# Patient Record
Sex: FEMALE | Race: OTHER | HISPANIC OR LATINO
[De-identification: names, ages, dates, MRNs, and addresses within clinical notes are randomized per-mention and may not be internally consistent; named-entity substitution may affect disease eponyms.]

---

## 2017-01-16 ENCOUNTER — LABORATORY RESULT (OUTPATIENT)
Age: 27
End: 2017-01-16

## 2017-01-16 ENCOUNTER — APPOINTMENT (OUTPATIENT)
Dept: INTERNAL MEDICINE | Facility: CLINIC | Age: 27
End: 2017-01-16

## 2017-01-16 VITALS
DIASTOLIC BLOOD PRESSURE: 82 MMHG | HEART RATE: 70 BPM | HEIGHT: 60 IN | SYSTOLIC BLOOD PRESSURE: 124 MMHG | RESPIRATION RATE: 16 BRPM | WEIGHT: 187 LBS | BODY MASS INDEX: 36.71 KG/M2

## 2017-01-17 LAB
25(OH)D3 SERPL-MCNC: 12.5 NG/ML
ALBUMIN SERPL ELPH-MCNC: 4.4 G/DL
ALP BLD-CCNC: 86 U/L
ALT SERPL-CCNC: 13 U/L
ANION GAP SERPL CALC-SCNC: 12 MMOL/L
APPEARANCE: CLEAR
AST SERPL-CCNC: 10 U/L
BASOPHILS # BLD AUTO: 0.03 K/UL
BASOPHILS NFR BLD AUTO: 0.3 %
BILIRUB SERPL-MCNC: 0.3 MG/DL
BILIRUBIN URINE: NEGATIVE
BLOOD URINE: NEGATIVE
BUN SERPL-MCNC: 9 MG/DL
CALCIUM SERPL-MCNC: 9.4 MG/DL
CHLORIDE SERPL-SCNC: 103 MMOL/L
CHOLEST SERPL-MCNC: 155 MG/DL
CHOLEST/HDLC SERPL: 5.2 RATIO
CO2 SERPL-SCNC: 26 MMOL/L
COLOR: YELLOW
CREAT SERPL-MCNC: 0.79 MG/DL
EOSINOPHIL # BLD AUTO: 0.09 K/UL
EOSINOPHIL NFR BLD AUTO: 0.9 %
GLUCOSE QUALITATIVE U: NORMAL MG/DL
GLUCOSE SERPL-MCNC: 101 MG/DL
HBA1C MFR BLD HPLC: 5.5 %
HCT VFR BLD CALC: 38.7 %
HDLC SERPL-MCNC: 30 MG/DL
HGB BLD-MCNC: 12.8 G/DL
IMM GRANULOCYTES NFR BLD AUTO: 0.2 %
KETONES URINE: NEGATIVE
LDLC SERPL CALC-MCNC: 109 MG/DL
LEUKOCYTE ESTERASE URINE: ABNORMAL
LYMPHOCYTES # BLD AUTO: 2.75 K/UL
LYMPHOCYTES NFR BLD AUTO: 27.1 %
MAN DIFF?: NORMAL
MCHC RBC-ENTMCNC: 28.8 PG
MCHC RBC-ENTMCNC: 33.1 GM/DL
MCV RBC AUTO: 87.2 FL
MONOCYTES # BLD AUTO: 0.64 K/UL
MONOCYTES NFR BLD AUTO: 6.3 %
NEUTROPHILS # BLD AUTO: 6.63 K/UL
NEUTROPHILS NFR BLD AUTO: 65.2 %
NITRITE URINE: NEGATIVE
PH URINE: 6.5
PLATELET # BLD AUTO: 287 K/UL
POTASSIUM SERPL-SCNC: 4.7 MMOL/L
PROT SERPL-MCNC: 7.2 G/DL
PROTEIN URINE: NEGATIVE MG/DL
RBC # BLD: 4.44 M/UL
RBC # FLD: 14.6 %
SODIUM SERPL-SCNC: 141 MMOL/L
SPECIFIC GRAVITY URINE: 1.01
T4 FREE SERPL-MCNC: 1.2 NG/DL
TRIGL SERPL-MCNC: 81 MG/DL
TSH SERPL-ACNC: 1.31 UIU/ML
UROBILINOGEN URINE: NORMAL MG/DL
WBC # FLD AUTO: 10.16 K/UL

## 2018-02-01 ENCOUNTER — TRANSCRIPTION ENCOUNTER (OUTPATIENT)
Age: 28
End: 2018-02-01

## 2018-04-24 ENCOUNTER — LABORATORY RESULT (OUTPATIENT)
Age: 28
End: 2018-04-24

## 2018-04-24 ENCOUNTER — APPOINTMENT (OUTPATIENT)
Dept: INTERNAL MEDICINE | Facility: CLINIC | Age: 28
End: 2018-04-24
Payer: COMMERCIAL

## 2018-04-24 VITALS
WEIGHT: 174 LBS | DIASTOLIC BLOOD PRESSURE: 76 MMHG | HEART RATE: 78 BPM | SYSTOLIC BLOOD PRESSURE: 118 MMHG | BODY MASS INDEX: 34.16 KG/M2 | HEIGHT: 60 IN | RESPIRATION RATE: 12 BRPM

## 2018-04-24 DIAGNOSIS — E04.9 NONTOXIC GOITER, UNSPECIFIED: ICD-10-CM

## 2018-04-24 PROCEDURE — 36415 COLL VENOUS BLD VENIPUNCTURE: CPT

## 2018-04-24 PROCEDURE — 99395 PREV VISIT EST AGE 18-39: CPT | Mod: 25

## 2018-04-25 LAB
25(OH)D3 SERPL-MCNC: 18.6 NG/ML
ALBUMIN SERPL ELPH-MCNC: 4.7 G/DL
ALP BLD-CCNC: 98 U/L
ALT SERPL-CCNC: 22 U/L
ANION GAP SERPL CALC-SCNC: 17 MMOL/L
APPEARANCE: CLEAR
AST SERPL-CCNC: 19 U/L
BILIRUB SERPL-MCNC: 0.4 MG/DL
BILIRUBIN URINE: NEGATIVE
BLOOD URINE: NEGATIVE
BUN SERPL-MCNC: 9 MG/DL
CALCIUM SERPL-MCNC: 10.3 MG/DL
CHLORIDE SERPL-SCNC: 100 MMOL/L
CHOLEST SERPL-MCNC: 227 MG/DL
CHOLEST/HDLC SERPL: 7.6 RATIO
CO2 SERPL-SCNC: 25 MMOL/L
COLOR: YELLOW
CREAT SERPL-MCNC: 0.75 MG/DL
CREAT SPEC-SCNC: 13 MG/DL
GLUCOSE QUALITATIVE U: NEGATIVE MG/DL
GLUCOSE SERPL-MCNC: 96 MG/DL
HBA1C MFR BLD HPLC: 5.4 %
HDLC SERPL-MCNC: 30 MG/DL
KETONES URINE: ABNORMAL
LDLC SERPL CALC-MCNC: 175 MG/DL
LEUKOCYTE ESTERASE URINE: ABNORMAL
MICROALBUMIN 24H UR DL<=1MG/L-MCNC: <0.3 MG/DL
MICROALBUMIN/CREAT 24H UR-RTO: NORMAL
NITRITE URINE: NEGATIVE
PH URINE: 7.5
POTASSIUM SERPL-SCNC: 4.2 MMOL/L
PROT SERPL-MCNC: 8 G/DL
PROTEIN URINE: NEGATIVE MG/DL
SODIUM SERPL-SCNC: 142 MMOL/L
SPECIFIC GRAVITY URINE: 1
T4 FREE SERPL-MCNC: 1.6 NG/DL
TRIGL SERPL-MCNC: 108 MG/DL
TSH SERPL-ACNC: 1.07 UIU/ML
UROBILINOGEN URINE: NEGATIVE MG/DL

## 2018-04-26 LAB
BASOPHILS # BLD AUTO: 0.01 K/UL
BASOPHILS NFR BLD AUTO: 0.1 %
EOSINOPHIL # BLD AUTO: 0.17 K/UL
EOSINOPHIL NFR BLD AUTO: 2.2 %
HCT VFR BLD CALC: 43.2 %
HGB BLD-MCNC: 13.1 G/DL
IMM GRANULOCYTES NFR BLD AUTO: 0.1 %
LYMPHOCYTES # BLD AUTO: 2.49 K/UL
LYMPHOCYTES NFR BLD AUTO: 31.9 %
MAN DIFF?: NORMAL
MCHC RBC-ENTMCNC: 28.5 PG
MCHC RBC-ENTMCNC: 30.3 GM/DL
MCV RBC AUTO: 93.9 FL
MONOCYTES # BLD AUTO: 0.44 K/UL
MONOCYTES NFR BLD AUTO: 5.6 %
NEUTROPHILS # BLD AUTO: 4.69 K/UL
NEUTROPHILS NFR BLD AUTO: 60.1 %
PLATELET # BLD AUTO: 283 K/UL
RBC # BLD: 4.6 M/UL
RBC # FLD: 16.6 %
WBC # FLD AUTO: 7.81 K/UL

## 2018-04-27 LAB
A ALTERNATA IGE QN: <0.1 KUA/L
A FUMIGATUS IGE QN: <0.1 KUA/L
BERMUDA GRASS IGE QN: <0.1 KUA/L
BOXELDER IGE QN: <0.1 KUA/L
C HERBARUM IGE QN: <0.1 KUA/L
CALIF WALNUT IGE QN: <0.1 KUA/L
CAT DANDER IGE QN: <0.1 KUA/L
CLAM IGE QN: <0.1 KUA/L
CMN PIGWEED IGE QN: <0.1 KUA/L
CODFISH IGE QN: <0.1 KUA/L
COMMON RAGWEED IGE QN: <0.1 KUA/L
CORN IGE QN: <0.1 KUA/L
COTTONWOOD IGE QN: <0.1 KUA/L
COW MILK IGE QN: <0.1 KUA/L
D FARINAE IGE QN: 10.9 KUA/L
D PTERONYSS IGE QN: 8.13 KUA/L
DEPRECATED A ALTERNATA IGE RAST QL: 0
DEPRECATED A FUMIGATUS IGE RAST QL: 0
DEPRECATED BERMUDA GRASS IGE RAST QL: 0
DEPRECATED BOXELDER IGE RAST QL: 0
DEPRECATED C HERBARUM IGE RAST QL: 0
DEPRECATED CAT DANDER IGE RAST QL: 0
DEPRECATED CLAM IGE RAST QL: 0
DEPRECATED CODFISH IGE RAST QL: 0
DEPRECATED COMMON PIGWEED IGE RAST QL: 0
DEPRECATED COMMON RAGWEED IGE RAST QL: 0
DEPRECATED CORN IGE RAST QL: 0
DEPRECATED COTTONWOOD IGE RAST QL: 0
DEPRECATED COW MILK IGE RAST QL: 0
DEPRECATED D FARINAE IGE RAST QL: ABNORMAL
DEPRECATED D PTERONYSS IGE RAST QL: ABNORMAL
DEPRECATED DOG DANDER IGE RAST QL: 0
DEPRECATED EGG WHITE IGE RAST QL: 0
DEPRECATED GOOSEFOOT IGE RAST QL: 0
DEPRECATED LONDON PLANE IGE RAST QL: 0
DEPRECATED MUGWORT IGE RAST QL: 0
DEPRECATED P NOTATUM IGE RAST QL: 0
DEPRECATED PEANUT IGE RAST QL: 0
DEPRECATED RED CEDAR IGE RAST QL: 0
DEPRECATED ROACH IGE RAST QL: ABNORMAL
DEPRECATED SCALLOP IGE RAST QL: <0.1 KUA/L
DEPRECATED SESAME SEED IGE RAST QL: 0
DEPRECATED SHEEP SORREL IGE RAST QL: 0
DEPRECATED SHRIMP IGE RAST QL: NORMAL
DEPRECATED SILVER BIRCH IGE RAST QL: 0
DEPRECATED SOYBEAN IGE RAST QL: 0
DEPRECATED TIMOTHY IGE RAST QL: 0
DEPRECATED WALNUT IGE RAST QL: 0
DEPRECATED WHEAT IGE RAST QL: 0
DEPRECATED WHITE ASH IGE RAST QL: 0
DEPRECATED WHITE OAK IGE RAST QL: 0
DOG DANDER IGE QN: <0.1 KUA/L
EGG WHITE IGE QN: <0.1 KUA/L
GOOSEFOOT IGE QN: <0.1 KUA/L
LONDON PLANE IGE QN: <0.1 KUA/L
MUGWORT IGE QN: <0.1 KUA/L
MULBERRY (T70) CLASS: 0
MULBERRY (T70) CONC: <0.1 KUA/L
P NOTATUM IGE QN: <0.1 KUA/L
PEANUT IGE QN: <0.1 KUA/L
RED CEDAR IGE QN: <0.1 KUA/L
ROACH IGE QN: 2.09 KUA/L
SCALLOP IGE QN: 0
SCALLOP IGE QN: 0.33 KUA/L
SESAME SEED IGE QN: <0.1 KUA/L
SHEEP SORREL IGE QN: <0.1 KUA/L
SILVER BIRCH IGE QN: <0.1 KUA/L
SOYBEAN IGE QN: <0.1 KUA/L
TIMOTHY IGE QN: <0.1 KUA/L
TREE ALLERG MIX1 IGE QL: 0
WALNUT IGE QN: <0.1 KUA/L
WHEAT IGE QN: <0.1 KUA/L
WHITE ASH IGE QN: <0.1 KUA/L
WHITE ELM IGE QN: 0
WHITE ELM IGE QN: <0.1 KUA/L
WHITE OAK IGE QN: <0.1 KUA/L

## 2018-06-01 ENCOUNTER — MEDICATION RENEWAL (OUTPATIENT)
Age: 28
End: 2018-06-01

## 2019-03-18 ENCOUNTER — CHART COPY (OUTPATIENT)
Age: 29
End: 2019-03-18

## 2019-03-18 ENCOUNTER — TRANSCRIPTION ENCOUNTER (OUTPATIENT)
Age: 29
End: 2019-03-18

## 2019-10-07 ENCOUNTER — APPOINTMENT (OUTPATIENT)
Dept: INTERNAL MEDICINE | Facility: CLINIC | Age: 29
End: 2019-10-07
Payer: COMMERCIAL

## 2019-10-07 VITALS
BODY MASS INDEX: 34.78 KG/M2 | RESPIRATION RATE: 14 BRPM | WEIGHT: 189 LBS | DIASTOLIC BLOOD PRESSURE: 80 MMHG | SYSTOLIC BLOOD PRESSURE: 120 MMHG | HEART RATE: 78 BPM | HEIGHT: 62 IN

## 2019-10-07 DIAGNOSIS — E04.9 NONTOXIC GOITER, UNSPECIFIED: ICD-10-CM

## 2019-10-07 PROCEDURE — 99395 PREV VISIT EST AGE 18-39: CPT | Mod: 25

## 2019-10-07 PROCEDURE — 36415 COLL VENOUS BLD VENIPUNCTURE: CPT

## 2019-10-07 RX ORDER — FLUTICASONE PROPIONATE 50 UG/1
50 SPRAY, METERED NASAL DAILY
Qty: 1 | Refills: 4 | Status: ACTIVE | COMMUNITY
Start: 2018-04-24 | End: 1900-01-01

## 2019-10-07 RX ORDER — ISOTRETINOIN 40 MG/1
40 CAPSULE, GELATIN COATED ORAL
Refills: 0 | Status: DISCONTINUED | COMMUNITY
End: 2019-10-07

## 2019-10-07 NOTE — HEALTH RISK ASSESSMENT
[No] : No [Excellent] : ~his/her~  mood as  excellent [0] : 1) Little interest or pleasure doing things: Not at all (0) [HIV Test offered] : HIV Test offered [High School] : high school [Employed] : employed [# Of Children ___] : has [unfilled] children [] :  [Sexually Active] : sexually active [Feels Safe at Home] : Feels safe at home [Fully functional (bathing, dressing, toileting, transferring, walking, feeding)] : Fully functional (bathing, dressing, toileting, transferring, walking, feeding) [Fully functional (using the telephone, shopping, preparing meals, housekeeping, doing laundry, using] : Fully functional and needs no help or supervision to perform IADLs (using the telephone, shopping, preparing meals, housekeeping, doing laundry, using transportation, managing medications and managing finances) [Seat Belt] :  uses seat belt [Safety elements used in home] : safety elements used in home [Sunscreen] : uses sunscreen [] : No [Change in mental status noted] : No change in mental status noted [Language] : denies difficulty with language [Behavior] : denies difficulty with behavior [Learning/Retaining New Information] : denies difficulty learning/retaining new information [Handling Complex Tasks] : denies difficulty handling complex tasks [Reasoning] : denies difficulty with reasoning [Spatial Ability and Orientation] : denies difficulty with spatial ability and orientation [High Risk Behavior] : no high risk behavior [Reports changes in hearing] : Reports no changes in hearing [Smoke Detector] : no smoke detector [Carbon Monoxide Detector] : no carbon monoxide detector [Guns at Home] : no guns at home [Travel to Developing Areas] : does not  travel to developing areas [PapSmearDate] : due [AdvancecareDate] : 10/7/19

## 2019-10-07 NOTE — COUNSELING
[Benefits of weight loss discussed] : Benefits of weight loss discussed [Encouraged to maintain food diary] : Encouraged to maintain food diary [Encouraged to increase physical activity] : Encouraged to increase physical activity

## 2019-10-07 NOTE — PHYSICAL EXAM
[Well Nourished] : well nourished [No Acute Distress] : no acute distress [Well Developed] : well developed [Well-Appearing] : well-appearing [Normal Sclera/Conjunctiva] : normal sclera/conjunctiva [PERRL] : pupils equal round and reactive to light [EOMI] : extraocular movements intact [Normal Outer Ear/Nose] : the outer ears and nose were normal in appearance [Normal Oropharynx] : the oropharynx was normal [No Lymphadenopathy] : no lymphadenopathy [No JVD] : no jugular venous distention [Supple] : supple [Thyroid Normal, No Nodules] : the thyroid was normal and there were no nodules present [No Respiratory Distress] : no respiratory distress  [No Accessory Muscle Use] : no accessory muscle use [Clear to Auscultation] : lungs were clear to auscultation bilaterally [Normal Rate] : normal rate  [Normal S1, S2] : normal S1 and S2 [Regular Rhythm] : with a regular rhythm [No Murmur] : no murmur heard [No Carotid Bruits] : no carotid bruits [No Abdominal Bruit] : a ~M bruit was not heard ~T in the abdomen [No Varicosities] : no varicosities [Pedal Pulses Present] : the pedal pulses are present [No Edema] : there was no peripheral edema [No Palpable Aorta] : no palpable aorta [No Extremity Clubbing/Cyanosis] : no extremity clubbing/cyanosis [Soft] : abdomen soft [Non Tender] : non-tender [Non-distended] : non-distended [No Masses] : no abdominal mass palpated [No HSM] : no HSM [Normal Bowel Sounds] : normal bowel sounds [Normal Posterior Cervical Nodes] : no posterior cervical lymphadenopathy [Normal Anterior Cervical Nodes] : no anterior cervical lymphadenopathy [No Spinal Tenderness] : no spinal tenderness [No CVA Tenderness] : no CVA  tenderness [No Joint Swelling] : no joint swelling [Grossly Normal Strength/Tone] : grossly normal strength/tone [No Rash] : no rash [Coordination Grossly Intact] : coordination grossly intact [No Focal Deficits] : no focal deficits [Normal Gait] : normal gait [Deep Tendon Reflexes (DTR)] : deep tendon reflexes were 2+ and symmetric [Normal Affect] : the affect was normal [Normal Insight/Judgement] : insight and judgment were intact

## 2019-10-07 NOTE — HISTORY OF PRESENT ILLNESS
[FreeTextEntry1] : For CPE [de-identified] : For CPE\par working on trying to lose weight\par IUD in place wants removed by gyn

## 2019-10-07 NOTE — PAST MEDICAL HISTORY
[Menstruating] : menstruating [Irregular Cycle Intervals] : are  irregular [Total Preg ___] : G[unfilled] [Live Births ___] : P[unfilled]

## 2019-10-08 LAB
25(OH)D3 SERPL-MCNC: 17 NG/ML
ALBUMIN SERPL ELPH-MCNC: 4.7 G/DL
ALP BLD-CCNC: 89 U/L
ALT SERPL-CCNC: 19 U/L
ANION GAP SERPL CALC-SCNC: 13 MMOL/L
APPEARANCE: CLEAR
AST SERPL-CCNC: 13 U/L
BASOPHILS # BLD AUTO: 0.02 K/UL
BASOPHILS NFR BLD AUTO: 0.2 %
BILIRUB SERPL-MCNC: 0.3 MG/DL
BILIRUBIN URINE: NEGATIVE
BLOOD URINE: NEGATIVE
BUN SERPL-MCNC: 9 MG/DL
CALCIUM SERPL-MCNC: 9.6 MG/DL
CHLORIDE SERPL-SCNC: 101 MMOL/L
CHOLEST SERPL-MCNC: 213 MG/DL
CHOLEST/HDLC SERPL: 6.3 RATIO
CO2 SERPL-SCNC: 28 MMOL/L
COLOR: NORMAL
CREAT SERPL-MCNC: 0.76 MG/DL
CREAT SPEC-SCNC: 123 MG/DL
EOSINOPHIL # BLD AUTO: 0.22 K/UL
EOSINOPHIL NFR BLD AUTO: 2.1 %
ESTIMATED AVERAGE GLUCOSE: 103 MG/DL
GLUCOSE QUALITATIVE U: NEGATIVE
GLUCOSE SERPL-MCNC: 102 MG/DL
HBA1C MFR BLD HPLC: 5.2 %
HCT VFR BLD CALC: 42.9 %
HCV AB SER QL: NONREACTIVE
HCV S/CO RATIO: 0.15 S/CO
HDLC SERPL-MCNC: 34 MG/DL
HGB BLD-MCNC: 13.3 G/DL
IMM GRANULOCYTES NFR BLD AUTO: 0.3 %
KETONES URINE: NEGATIVE
LDLC SERPL CALC-MCNC: 160 MG/DL
LEUKOCYTE ESTERASE URINE: NEGATIVE
LYMPHOCYTES # BLD AUTO: 2.49 K/UL
LYMPHOCYTES NFR BLD AUTO: 23.8 %
MAN DIFF?: NORMAL
MCHC RBC-ENTMCNC: 28.9 PG
MCHC RBC-ENTMCNC: 31 GM/DL
MCV RBC AUTO: 93.3 FL
MICROALBUMIN 24H UR DL<=1MG/L-MCNC: <1.2 MG/DL
MICROALBUMIN/CREAT 24H UR-RTO: NORMAL MG/G
MONOCYTES # BLD AUTO: 0.69 K/UL
MONOCYTES NFR BLD AUTO: 6.6 %
NEUTROPHILS # BLD AUTO: 7.01 K/UL
NEUTROPHILS NFR BLD AUTO: 67 %
NITRITE URINE: NEGATIVE
PH URINE: 6.5
PLATELET # BLD AUTO: 301 K/UL
POTASSIUM SERPL-SCNC: 4.3 MMOL/L
PROT SERPL-MCNC: 7.5 G/DL
PROTEIN URINE: NEGATIVE
RBC # BLD: 4.6 M/UL
RBC # FLD: 13.9 %
SODIUM SERPL-SCNC: 142 MMOL/L
SPECIFIC GRAVITY URINE: 1.02
T4 FREE SERPL-MCNC: 1.2 NG/DL
TRIGL SERPL-MCNC: 95 MG/DL
TSH SERPL-ACNC: 1.89 UIU/ML
UROBILINOGEN URINE: NORMAL
WBC # FLD AUTO: 10.46 K/UL

## 2019-10-09 ENCOUNTER — CHART COPY (OUTPATIENT)
Age: 29
End: 2019-10-09

## 2019-10-14 LAB — HUMAN IMMUNODEFICIENCY VIRUS 1 (HIV-1) QUALITATIVE, RNA: NEGATIVE

## 2019-12-29 ENCOUNTER — TRANSCRIPTION ENCOUNTER (OUTPATIENT)
Age: 29
End: 2019-12-29

## 2019-12-30 ENCOUNTER — CHART COPY (OUTPATIENT)
Age: 29
End: 2019-12-30

## 2020-01-30 ENCOUNTER — APPOINTMENT (OUTPATIENT)
Dept: CHRONIC DISEASE MANAGEMENT | Facility: CLINIC | Age: 30
End: 2020-01-30
Payer: COMMERCIAL

## 2020-01-30 VITALS — WEIGHT: 187 LBS | BODY MASS INDEX: 34.41 KG/M2 | HEIGHT: 62 IN

## 2020-01-30 PROCEDURE — 97802 MEDICAL NUTRITION INDIV IN: CPT

## 2020-02-19 ENCOUNTER — APPOINTMENT (OUTPATIENT)
Dept: CHRONIC DISEASE MANAGEMENT | Facility: CLINIC | Age: 30
End: 2020-02-19
Payer: COMMERCIAL

## 2020-02-19 VITALS — BODY MASS INDEX: 34.04 KG/M2 | HEIGHT: 62 IN | WEIGHT: 185 LBS

## 2020-02-19 PROCEDURE — 97803 MED NUTRITION INDIV SUBSEQ: CPT

## 2020-04-25 ENCOUNTER — MESSAGE (OUTPATIENT)
Age: 30
End: 2020-04-25

## 2020-05-22 ENCOUNTER — APPOINTMENT (OUTPATIENT)
Dept: DISASTER EMERGENCY | Facility: CLINIC | Age: 30
End: 2020-05-22

## 2020-05-23 LAB
SARS-COV-2 IGG SERPL IA-ACNC: 7.44 INDEX
SARS-COV-2 IGG SERPL QL IA: POSITIVE

## 2021-01-19 ENCOUNTER — APPOINTMENT (OUTPATIENT)
Dept: INTERNAL MEDICINE | Facility: CLINIC | Age: 31
End: 2021-01-19

## 2021-01-19 DIAGNOSIS — Z20.828 CONTACT WITH AND (SUSPECTED) EXPOSURE TO OTHER VIRAL COMMUNICABLE DISEASES: ICD-10-CM

## 2021-01-20 ENCOUNTER — NON-APPOINTMENT (OUTPATIENT)
Age: 31
End: 2021-01-20

## 2021-01-20 LAB
RAPID RVP RESULT: DETECTED
RV+EV RNA SPEC QL NAA+PROBE: DETECTED
SARS-COV-2 RNA PNL RESP NAA+PROBE: NOT DETECTED

## 2021-05-27 ENCOUNTER — TRANSCRIPTION ENCOUNTER (OUTPATIENT)
Age: 31
End: 2021-05-27

## 2021-06-14 ENCOUNTER — TRANSCRIPTION ENCOUNTER (OUTPATIENT)
Age: 31
End: 2021-06-14

## 2021-06-15 ENCOUNTER — APPOINTMENT (OUTPATIENT)
Dept: ORTHOPEDIC SURGERY | Facility: CLINIC | Age: 31
End: 2021-06-15
Payer: COMMERCIAL

## 2021-06-15 VITALS
WEIGHT: 185 LBS | DIASTOLIC BLOOD PRESSURE: 83 MMHG | BODY MASS INDEX: 36.32 KG/M2 | HEART RATE: 83 BPM | TEMPERATURE: 97.9 F | SYSTOLIC BLOOD PRESSURE: 144 MMHG | HEIGHT: 60 IN

## 2021-06-15 DIAGNOSIS — S92.352A DISPLACED FRACTURE OF FIFTH METATARSAL BONE, LEFT FOOT, INITIAL ENCOUNTER FOR CLOSED FRACTURE: ICD-10-CM

## 2021-06-15 PROCEDURE — 28470 CLTX METATARSAL FX WO MNP EA: CPT | Mod: T4

## 2021-06-15 PROCEDURE — 99203 OFFICE O/P NEW LOW 30 MIN: CPT | Mod: 57

## 2021-06-15 PROCEDURE — 99072 ADDL SUPL MATRL&STAF TM PHE: CPT

## 2021-06-15 NOTE — PHYSICAL EXAM
[de-identified] : General: Well appearing, no acute distress, A&O\par Neurologic: A&Ox3, No focal deficits\par Head: NCAT without abrasions, lacerations, or ecchymosis to head, face, or scalp\par Respiratory: Equal chest wall expansion bilaterally, no accessory muscle use\par Lymphatic: No lymphadenopathy palpated\par Skin: Warm and dry\par Psychiatric: Normal mood and affect\par LLE: splint removed, small abrasion over lateral forefoot, +foot swelling/ecchymosis, TTP over 5th metatarsal, able to wiggle toes, SILT, compartments soft, no calf TTP [de-identified] : xrays left foot performed at urgent care 6/14/21 demonstrate distal oblique fracture of 5th metatarsal shaft, mildly displaced

## 2021-06-15 NOTE — DISCUSSION/SUMMARY
[de-identified] : Treatment options discussed at great length \par Pain control, Tylenol/Motrin as needed, Ice/elevate\par Hard sole walking shoe provided, NWB for the next 1-2 weeks, then advance weight bearing as tolerated\par Patient will return in 1 month for repeat xrays and evaluation\par All questions answered\par \par Orthopaedic Trauma Surgeon Addendum:\par \par I agree with the above resident physician note.  \par Chart was reviewed and patient examined in the orthopedic office. I agree with the assessment and plan of the resident.\par \par I was physically present for the key portions of the evaluation and management service provided. I agree with the above history, physical and plan. Appropriate imaging has been reviewed and the plan adjusted as needed.\par \par Chris Scott MD\par Orthopaedic Trauma Surgeon\par [facility]\par [facility] [place]

## 2021-06-15 NOTE — HISTORY OF PRESENT ILLNESS
[2] : a current pain level of 2/10 [7] : a minimum pain level of 7/10 [Weight Bearing] : worsened by weight bearing [de-identified] : Patient is a 32 YO F who presents for follow up of left foot injury. She sustained a mechanical fall 6/5/21 and immediately began to experience pain over the lateral boarder of her foot. Pain, swelling and ecchymosis progressed over the following several days prompting a visit to urgent care yesterday. She was diagnosed with a 5th metatarsal fracture and placed in a splint. She presents today for further management. She denies ankle pain or other injury. She has a small abrasion over lateral foot, that she states she was given antibiotics for. Denies numbness/tingling.  The patient states the pain is made worse with activity and relieved with rest. dull, 4/10

## 2021-07-13 ENCOUNTER — APPOINTMENT (OUTPATIENT)
Dept: ORTHOPEDIC SURGERY | Facility: CLINIC | Age: 31
End: 2021-07-13
Payer: COMMERCIAL

## 2021-07-13 VITALS
WEIGHT: 185 LBS | BODY MASS INDEX: 36.32 KG/M2 | SYSTOLIC BLOOD PRESSURE: 137 MMHG | DIASTOLIC BLOOD PRESSURE: 88 MMHG | HEIGHT: 60 IN | HEART RATE: 81 BPM

## 2021-07-13 PROCEDURE — 99024 POSTOP FOLLOW-UP VISIT: CPT

## 2021-07-13 PROCEDURE — 73630 X-RAY EXAM OF FOOT: CPT | Mod: LT

## 2021-07-13 NOTE — PHYSICAL EXAM
[de-identified] : Physical Exam:\par General: Well appearing, no acute distress, A&O\par Neurologic: A&Ox3, No focal deficits\par Head: NCAT without abrasions, lacerations, or ecchymosis to head, face, or scalp\par Respiratory: Equal chest wall expansion bilaterally, no accessory muscle use\par Lymphatic: No lymphadenopathy palpated\par Skin: Warm and dry\par Psychiatric: Normal mood and affect\par \par LLE \par SILT s/s/sp/dp/t\par Fires EHL/FHL/GS/TA\par 2+ DP/PT pulse\par brisk capillary refill\par \par Positive tenderness to palpation over the lateral aspect of the foot [de-identified] : 3 views of the left foot were obtained today show a minimally displaced fifth metacarpal fracture with interval healing.

## 2021-07-13 NOTE — HISTORY OF PRESENT ILLNESS
[de-identified] : Patient is a 32 YO F who presents for follow up of left foot injury. She sustained a mechanical fall 6/5/21 and immediately began to experience pain over the lateral boarder of her foot. Pain, swelling and ecchymosis progressed over the following several days prompting a visit to urgent care .She was diagnosed with a 5th metatarsal fracture and placed in a hard soled shoe at her last visit. She presents today for further management. She denies ankle pain or other injury.  Denies numbness/tingling.  The patient states the pain is made worse with activity and relieved with rest. dull, 2/10 [Bending] : worsened by bending [Lifting] : worsened by lifting [Weight Bearing] : worsened by weight bearing [Recumbency] : relieved by recumbency [Rest] : relieved by rest

## 2021-07-13 NOTE — DISCUSSION/SUMMARY
[de-identified] : She continues to improve.  Weightbearing as tolerated.  She was given a better fitting shoe today.  She can wean out of it and try regular shoes when she feels comfortable.  She can come back in a month with repeat x-rays or follow-up as needed.\par \par Chris Scott MD\par Orthopaedic Trauma Surgeon\par [facility]\par [facility] [place]

## 2021-08-10 ENCOUNTER — APPOINTMENT (OUTPATIENT)
Dept: ORTHOPEDIC SURGERY | Facility: CLINIC | Age: 31
End: 2021-08-10
Payer: COMMERCIAL

## 2021-08-10 VITALS
WEIGHT: 185 LBS | HEART RATE: 84 BPM | BODY MASS INDEX: 36.32 KG/M2 | HEIGHT: 60 IN | DIASTOLIC BLOOD PRESSURE: 82 MMHG | SYSTOLIC BLOOD PRESSURE: 119 MMHG

## 2021-08-10 PROCEDURE — 99024 POSTOP FOLLOW-UP VISIT: CPT

## 2021-08-10 PROCEDURE — 73630 X-RAY EXAM OF FOOT: CPT | Mod: LT

## 2021-08-10 NOTE — PHYSICAL EXAM
[de-identified] : Physical Exam:\par General: Well appearing, no acute distress, A&O\par Neurologic: A&Ox3, No focal deficits\par Head: NCAT without abrasions, lacerations, or ecchymosis to head, face, or scalp\par Respiratory: Equal chest wall expansion bilaterally, no accessory muscle use\par Lymphatic: No lymphadenopathy palpated\par Skin: Warm and dry\par Psychiatric: Normal mood and affect\par \par LLE \par SILT s/s/sp/dp/t\par Fires EHL/FHL/GS/TA\par 2+ DP/PT pulse\par brisk capillary refill\par \par Positive tenderness to palpation over the lateral aspect of the foot [de-identified] : 3 views of the left foot were obtained today show a minimally displaced fifth metacarpal fracture with continued fracture gap noted concerning for delayed union or nonunion

## 2021-08-10 NOTE — DISCUSSION/SUMMARY
[de-identified] : 31-year-old female with metatarsal fracture with minimal change in healing concerning for developing nonunion.  As the patient continues to have pain and minimal change noted on x-rays, it is concerning that this fracture may be developing a nonunion.  We will order a bone stimulator to hopefully increase the chances of healing without surgical intervention.  I would like her to come back and see me a month after she starts using the bone stimulator see if there has been any significant change in fracture healing.  If no significant changes are noted, she may need surgical intervention for nonunion repair.  Bone stimulator will be ordered today.  She was advised to call us in a few days if she does not hear back about the bone stimulator.\par \par The patient was given the opportunity to ask questions and all questions were answered to their satisfaction.\par \par Chris Scott MD\par Orthopaedic Trauma Surgeon\par [facility]\par [facility] [place]

## 2021-08-10 NOTE — HISTORY OF PRESENT ILLNESS
[de-identified] : Patient is a 32 YO F who presents for follow up of left foot injury. She sustained a mechanical fall 6/5/21 and immediately began to experience pain over the lateral boarder of her foot. Pain, swelling and ecchymosis progressed over the following several days prompting a visit to urgent care .She was diagnosed with a 5th metatarsal fracture and placed in a hard soled shoe at her last visit. She presents today for further management. She denies ankle pain or other injury.  Denies numbness/tingling.  The patient states the pain is made worse with activity and relieved with rest. dull, 2/10. Has not improved much since last visit

## 2021-10-01 ENCOUNTER — TRANSCRIPTION ENCOUNTER (OUTPATIENT)
Age: 31
End: 2021-10-01

## 2021-10-05 ENCOUNTER — APPOINTMENT (OUTPATIENT)
Dept: ORTHOPEDIC SURGERY | Facility: CLINIC | Age: 31
End: 2021-10-05
Payer: COMMERCIAL

## 2021-10-05 VITALS
WEIGHT: 185 LBS | HEART RATE: 80 BPM | BODY MASS INDEX: 36.32 KG/M2 | HEIGHT: 60 IN | SYSTOLIC BLOOD PRESSURE: 143 MMHG | TEMPERATURE: 98.1 F | DIASTOLIC BLOOD PRESSURE: 98 MMHG

## 2021-10-05 DIAGNOSIS — S92.352G DISPLACED FRACTURE OF FIFTH METATARSAL BONE, LEFT FOOT, SUBSEQUENT ENCOUNTER FOR FRACTURE WITH DELAYED HEALING: ICD-10-CM

## 2021-10-05 DIAGNOSIS — S92.352K DISPLACED FRACTURE OF FIFTH METATARSAL BONE, LEFT FOOT, SUBSEQUENT ENCOUNTER FOR FRACTURE WITH NONUNION: ICD-10-CM

## 2021-10-05 PROCEDURE — 73630 X-RAY EXAM OF FOOT: CPT | Mod: LT

## 2021-10-05 PROCEDURE — 99214 OFFICE O/P EST MOD 30 MIN: CPT

## 2021-10-11 PROBLEM — S92.352K CLOSED DISPLACED FRACTURE OF FIFTH METATARSAL BONE OF LEFT FOOT WITH NONUNION: Status: ACTIVE | Noted: 2021-10-11

## 2021-10-11 PROBLEM — S92.352G CLOSED DISPLACED FRACTURE OF FIFTH METATARSAL BONE OF LEFT FOOT WITH DELAYED HEALING: Status: ACTIVE | Noted: 2021-08-10

## 2021-10-11 NOTE — HISTORY OF PRESENT ILLNESS
[Bending] : worsened by bending [Lifting] : worsened by lifting [Walking] : worsened by walking [Weight Bearing] : worsened by weight bearing [Recumbency] : relieved by recumbency [Rest] : relieved by rest [de-identified] : Patient is a 32 YO F who presents for follow up of left foot injury. She sustained a mechanical fall 6/5/21 and immediately began to experience pain over the lateral boarder of her foot. Pain, swelling and ecchymosis progressed over the following several days prompting a visit to urgent care .She was diagnosed with a 5th metatarsal fracture and placed in a hard soled shoe at her last visit. She presents today for further management. She denies ankle pain or other injury.  Denies numbness/tingling.  The patient states the pain is made worse with activity and relieved with rest. dull, 2/10. Has not improved much since last visit.  She has started having pain in the contralateral foot secondary to gait problems due to the continued pain in the left foot.  We had ordered a bone stimulator at a previous visit due to a delay in healing.  It has not yet been approved.

## 2021-10-11 NOTE — PHYSICAL EXAM
[de-identified] : Physical Exam:\par General: Well appearing, no acute distress, A&O\par Neurologic: A&Ox3, No focal deficits\par Head: NCAT without abrasions, lacerations, or ecchymosis to head, face, or scalp\par Respiratory: Equal chest wall expansion bilaterally, no accessory muscle use\par Lymphatic: No lymphadenopathy palpated\par Skin: Warm and dry\par Psychiatric: Normal mood and affect\par \par LLE \par SILT s/s/sp/dp/t\par Fires EHL/FHL/GS/TA\par 2+ DP/PT pulse\par brisk capillary refill\par \par Positive tenderness to palpation over the lateral aspect of the foot [de-identified] : 3 views of the left foot were obtained today show a minimally displaced fifth metacarpal fracture with continued fracture gap noted concerning for delayed union or nonunion.  There is a spot healing at the proximal and distal extents of the fracture but a large gap is noted in the middle portion of the fracture.

## 2021-10-11 NOTE — DISCUSSION/SUMMARY
[de-identified] : 31-year-old female with metatarsal fracture with continued pain.  Despite some healing, she is still significantly painful and having difficulty ambulating.  At this point I would consider it a displaced metatarsal fracture with nonunion due to the lack of healing in acceptable time.  Due to this we will continue to work towards obtaining the bone stimulator but I would also recommend referring her to physical therapy as well as podiatry for discussion of alternative pain management needs.  If she has any issues going to either 1 of these, she can give us a call and we can discuss alternative options.  Another option would be to obtain a CT scan to evaluate for fracture healing but I would like to pursue these other options 1st.\par \par The patient was given the opportunity to ask questions and all questions were answered to their satisfaction.\par \par Chris Scott MD\par Orthopaedic Trauma Surgeon\par [facility]\par [facility] [place]

## 2021-12-22 ENCOUNTER — APPOINTMENT (OUTPATIENT)
Dept: ORTHOPEDIC SURGERY | Facility: CLINIC | Age: 31
End: 2021-12-22
Payer: COMMERCIAL

## 2021-12-22 VITALS
SYSTOLIC BLOOD PRESSURE: 145 MMHG | HEIGHT: 60 IN | DIASTOLIC BLOOD PRESSURE: 97 MMHG | HEART RATE: 76 BPM | TEMPERATURE: 97.9 F

## 2021-12-22 DIAGNOSIS — S92.352D DISPLACED FRACTURE OF FIFTH METATARSAL BONE, LEFT FOOT, SUBSEQUENT ENCOUNTER FOR FRACTURE WITH ROUTINE HEALING: ICD-10-CM

## 2021-12-22 DIAGNOSIS — G57.62 LESION OF PLANTAR NERVE, LEFT LOWER LIMB: ICD-10-CM

## 2021-12-22 PROCEDURE — 99214 OFFICE O/P EST MOD 30 MIN: CPT

## 2021-12-22 PROCEDURE — 73630 X-RAY EXAM OF FOOT: CPT | Mod: LT

## 2021-12-22 NOTE — PHYSICAL EXAM
[de-identified] : Physical Exam:\par General: Well appearing, no acute distress, A&O\par Neurologic: A&Ox3, No focal deficits\par Head: NCAT without abrasions, lacerations, or ecchymosis to head, face, or scalp\par Respiratory: Equal chest wall expansion bilaterally, no accessory muscle use\par Lymphatic: No lymphadenopathy palpated\par Skin: Warm and dry\par Psychiatric: Normal mood and affect\par \par LLE \par SILT s/s/sp/dp/t\par Fires EHL/FHL/GS/TA\par 2+ DP/PT pulse\par brisk capillary refill\par \par Positive tenderness to palpation over the lateral aspect of the foot. Positive pain with compression of the metatarsals [de-identified] : 3 views of the left foot were obtained today show a minimally displaced fifth metacarpal fracture with interval healing noted. There is still some fracture gap but healing is noted along the distal aspect of the fracture

## 2021-12-22 NOTE — DISCUSSION/SUMMARY
[de-identified] : 31-year-old female with metatarsal fracture with continued pain due to delayed healing of the fifth metatarsal fracture. She also likely has a neuroma in that foot as evidenced by the compression of the metatarsals. I would recommend follow-up with either pain management, foot and ankle or podiatry to discuss injections versus neuroma excision. She is hoping to return to work in early January and I feel this is appropriate. The patient was given the opportunity to ask questions and all questions were answered to their satisfaction.\par \par No orthopedic trauma intervention is required but we will help facilitate future care as needed. The patient may follow up as needed.\par \par The patient was given the opportunity to ask questions and all questions were answered to their satisfaction.\par \par Chris Scott MD\par Orthopaedic Trauma Surgeon\par [facility]\par [facility] [place]

## 2021-12-22 NOTE — HISTORY OF PRESENT ILLNESS
[de-identified] : Patient is a 32 YO F who presents for follow up of left foot injury. She sustained a mechanical fall 6/5/21 and immediately began to experience pain over the lateral boarder of her foot. Pain, swelling and ecchymosis progressed over the following several days prompting a visit to urgent care .She was diagnosed with a 5th metatarsal fracture and placed in a hard soled shoe. She presents today for further management. She denies ankle pain or other injury.  Denies numbness/tingling.  The patient states the pain is made worse with activity and relieved with rest. dull, 2/10. Has not improved much since last visit.  She has started having pain in the contralateral foot secondary to gait problems due to the continued pain in the left foot.  We had ordered a bone stimulator at a previous visit due to a delay in healing. She feels she still has some "tension" over that foot. It is hypersensitive. [Bending] : worsened by bending [Lifting] : worsened by lifting [Weight Bearing] : worsened by weight bearing [Recumbency] : relieved by recumbency [Rest] : relieved by rest

## 2022-01-08 ENCOUNTER — TRANSCRIPTION ENCOUNTER (OUTPATIENT)
Age: 32
End: 2022-01-08

## 2022-01-11 ENCOUNTER — APPOINTMENT (OUTPATIENT)
Dept: INTERNAL MEDICINE | Facility: CLINIC | Age: 32
End: 2022-01-11
Payer: COMMERCIAL

## 2022-01-11 VITALS
HEART RATE: 78 BPM | WEIGHT: 183 LBS | SYSTOLIC BLOOD PRESSURE: 120 MMHG | BODY MASS INDEX: 35.93 KG/M2 | HEIGHT: 60 IN | DIASTOLIC BLOOD PRESSURE: 90 MMHG | RESPIRATION RATE: 12 BRPM

## 2022-01-11 VITALS — DIASTOLIC BLOOD PRESSURE: 90 MMHG | WEIGHT: 183 LBS | BODY MASS INDEX: 35.74 KG/M2 | SYSTOLIC BLOOD PRESSURE: 120 MMHG

## 2022-01-11 DIAGNOSIS — R03.0 ELEVATED BLOOD-PRESSURE READING, W/OUT DIAGNOSIS OF HYPERTENSION: ICD-10-CM

## 2022-01-11 DIAGNOSIS — J30.9 ALLERGIC RHINITIS, UNSPECIFIED: ICD-10-CM

## 2022-01-11 DIAGNOSIS — R05.3 CHRONIC COUGH: ICD-10-CM

## 2022-01-11 PROCEDURE — 99214 OFFICE O/P EST MOD 30 MIN: CPT

## 2022-01-11 RX ORDER — BENZONATATE 200 MG/1
200 CAPSULE ORAL 3 TIMES DAILY
Qty: 28 | Refills: 1 | Status: ACTIVE | COMMUNITY
Start: 2022-01-11 | End: 1900-01-01

## 2022-01-11 NOTE — ASSESSMENT
[FreeTextEntry1] : bp elevation maybe related to use of nsaid and OCP\par stop nsaid monitor at home low salt diet\par repeat in 2months\par continue ocp for now\par rhinitis, cough likely post nasal , tessalon\par cpe next visit

## 2022-01-11 NOTE — HISTORY OF PRESENT ILLNESS
[FreeTextEntry1] : bp check\par dry cough at night [de-identified] : patient on OCP has been taking ibuprofen for foot pain\par and on few occasions bp has been elevated\par has headaches occasionally but no chest pain sob nvd or palpitations\par has dry cough at night

## 2022-03-04 ENCOUNTER — TRANSCRIPTION ENCOUNTER (OUTPATIENT)
Age: 32
End: 2022-03-04

## 2022-04-28 ENCOUNTER — APPOINTMENT (OUTPATIENT)
Dept: INTERNAL MEDICINE | Facility: CLINIC | Age: 32
End: 2022-04-28

## 2023-09-19 ENCOUNTER — LABORATORY RESULT (OUTPATIENT)
Age: 33
End: 2023-09-19

## 2023-09-19 ENCOUNTER — NON-APPOINTMENT (OUTPATIENT)
Age: 33
End: 2023-09-19

## 2023-09-19 ENCOUNTER — APPOINTMENT (OUTPATIENT)
Dept: INTERNAL MEDICINE | Facility: CLINIC | Age: 33
End: 2023-09-19
Payer: MEDICAID

## 2023-09-19 VITALS
HEART RATE: 68 BPM | SYSTOLIC BLOOD PRESSURE: 120 MMHG | OXYGEN SATURATION: 95 % | BODY MASS INDEX: 36.61 KG/M2 | DIASTOLIC BLOOD PRESSURE: 80 MMHG | RESPIRATION RATE: 13 BRPM | WEIGHT: 186.5 LBS | HEIGHT: 60 IN

## 2023-09-19 DIAGNOSIS — Z00.00 ENCOUNTER FOR GENERAL ADULT MEDICAL EXAMINATION W/OUT ABNORMAL FINDINGS: ICD-10-CM

## 2023-09-19 DIAGNOSIS — Z23 ENCOUNTER FOR IMMUNIZATION: ICD-10-CM

## 2023-09-19 PROCEDURE — 99214 OFFICE O/P EST MOD 30 MIN: CPT | Mod: 25

## 2023-09-19 PROCEDURE — 93000 ELECTROCARDIOGRAM COMPLETE: CPT

## 2023-09-19 PROCEDURE — 90686 IIV4 VACC NO PRSV 0.5 ML IM: CPT

## 2023-09-19 PROCEDURE — G0008: CPT

## 2023-09-19 PROCEDURE — 99395 PREV VISIT EST AGE 18-39: CPT | Mod: 25

## 2023-09-19 PROCEDURE — 36415 COLL VENOUS BLD VENIPUNCTURE: CPT

## 2023-09-19 RX ORDER — TRIAMCINOLONE ACETONIDE 0.25 MG/G
0.03 CREAM TOPICAL 3 TIMES DAILY
Qty: 2 | Refills: 2 | Status: ACTIVE | COMMUNITY
Start: 2023-09-19 | End: 1900-01-01

## 2023-09-20 LAB
ALBUMIN SERPL ELPH-MCNC: 4.6 G/DL
ALP BLD-CCNC: 98 U/L
ALT SERPL-CCNC: 19 U/L
ANION GAP SERPL CALC-SCNC: 15 MMOL/L
APPEARANCE: CLEAR
AST SERPL-CCNC: 13 U/L
BACTERIA: NEGATIVE /HPF
BASOPHILS # BLD AUTO: 0.04 K/UL
BASOPHILS NFR BLD AUTO: 0.5 %
BILIRUB SERPL-MCNC: 0.3 MG/DL
BILIRUBIN URINE: NEGATIVE
BLOOD URINE: NEGATIVE
BUN SERPL-MCNC: 14 MG/DL
CALCIUM SERPL-MCNC: 8.8 MG/DL
CAST: 0 /LPF
CHLORIDE SERPL-SCNC: 102 MMOL/L
CHOLEST SERPL-MCNC: 183 MG/DL
CO2 SERPL-SCNC: 22 MMOL/L
COLOR: YELLOW
CREAT SERPL-MCNC: 0.58 MG/DL
EGFR: 124 ML/MIN/1.73M2
EOSINOPHIL # BLD AUTO: 0.08 K/UL
EOSINOPHIL NFR BLD AUTO: 0.9 %
EPITHELIAL CELLS: 7 /HPF
ESTIMATED AVERAGE GLUCOSE: 111 MG/DL
GLUCOSE QUALITATIVE U: NEGATIVE MG/DL
GLUCOSE SERPL-MCNC: 75 MG/DL
HBA1C MFR BLD HPLC: 5.5 %
HCT VFR BLD CALC: 39.9 %
HDLC SERPL-MCNC: 39 MG/DL
HGB BLD-MCNC: 12.3 G/DL
IMM GRANULOCYTES NFR BLD AUTO: 0.2 %
KETONES URINE: NEGATIVE MG/DL
LDLC SERPL CALC-MCNC: 132 MG/DL
LEUKOCYTE ESTERASE URINE: NEGATIVE
LYMPHOCYTES # BLD AUTO: 2.88 K/UL
LYMPHOCYTES NFR BLD AUTO: 32.5 %
MAN DIFF?: NORMAL
MCHC RBC-ENTMCNC: 26.7 PG
MCHC RBC-ENTMCNC: 30.8 GM/DL
MCV RBC AUTO: 86.7 FL
MICROSCOPIC-UA: NORMAL
MONOCYTES # BLD AUTO: 0.58 K/UL
MONOCYTES NFR BLD AUTO: 6.6 %
NEUTROPHILS # BLD AUTO: 5.25 K/UL
NEUTROPHILS NFR BLD AUTO: 59.3 %
NITRITE URINE: NEGATIVE
NONHDLC SERPL-MCNC: 144 MG/DL
PH URINE: 6.5
PLATELET # BLD AUTO: 285 K/UL
POTASSIUM SERPL-SCNC: 4 MMOL/L
PROT SERPL-MCNC: 7.4 G/DL
PROTEIN URINE: NEGATIVE MG/DL
RBC # BLD: 4.6 M/UL
RBC # FLD: 16.2 %
RED BLOOD CELLS URINE: 1 /HPF
SODIUM SERPL-SCNC: 140 MMOL/L
SPECIFIC GRAVITY URINE: 1.02
T4 FREE SERPL-MCNC: 1.1 NG/DL
TRIGL SERPL-MCNC: 65 MG/DL
TSH SERPL-ACNC: 1.18 UIU/ML
UROBILINOGEN URINE: 0.2 MG/DL
WBC # FLD AUTO: 8.85 K/UL
WHITE BLOOD CELLS URINE: 1 /HPF

## 2023-12-11 ENCOUNTER — OUTPATIENT (OUTPATIENT)
Dept: OUTPATIENT SERVICES | Facility: HOSPITAL | Age: 33
LOS: 1 days | End: 2023-12-11
Payer: COMMERCIAL

## 2023-12-11 ENCOUNTER — APPOINTMENT (OUTPATIENT)
Dept: RADIOLOGY | Facility: HOSPITAL | Age: 33
End: 2023-12-11

## 2023-12-11 ENCOUNTER — RESULT REVIEW (OUTPATIENT)
Age: 33
End: 2023-12-11

## 2023-12-11 DIAGNOSIS — R76.11 NONSPECIFIC REACTION TO TUBERCULIN SKIN TEST WITHOUT ACTIVE TUBERCULOSIS: ICD-10-CM

## 2023-12-11 PROCEDURE — 71046 X-RAY EXAM CHEST 2 VIEWS: CPT | Mod: 26

## 2024-01-02 ENCOUNTER — APPOINTMENT (OUTPATIENT)
Dept: INTERNAL MEDICINE | Facility: CLINIC | Age: 34
End: 2024-01-02
Payer: MEDICAID

## 2024-01-02 VITALS
HEIGHT: 60 IN | RESPIRATION RATE: 15 BRPM | BODY MASS INDEX: 36.52 KG/M2 | DIASTOLIC BLOOD PRESSURE: 70 MMHG | HEART RATE: 70 BPM | SYSTOLIC BLOOD PRESSURE: 110 MMHG | WEIGHT: 186 LBS | OXYGEN SATURATION: 96 %

## 2024-01-02 DIAGNOSIS — G47.9 SLEEP DISORDER, UNSPECIFIED: ICD-10-CM

## 2024-01-02 DIAGNOSIS — L30.9 DERMATITIS, UNSPECIFIED: ICD-10-CM

## 2024-01-02 DIAGNOSIS — E66.9 OBESITY, UNSPECIFIED: ICD-10-CM

## 2024-01-02 DIAGNOSIS — F41.9 ANXIETY DISORDER, UNSPECIFIED: ICD-10-CM

## 2024-01-02 PROCEDURE — 99214 OFFICE O/P EST MOD 30 MIN: CPT

## 2024-01-02 RX ORDER — BUSPIRONE HYDROCHLORIDE 5 MG/1
5 TABLET ORAL TWICE DAILY
Qty: 60 | Refills: 0 | Status: ACTIVE | COMMUNITY
Start: 2023-09-19 | End: 1900-01-01

## 2024-01-02 RX ORDER — TRAZODONE HYDROCHLORIDE 50 MG/1
50 TABLET ORAL
Qty: 90 | Refills: 1 | Status: ACTIVE | COMMUNITY
Start: 2024-01-02 | End: 1900-01-01

## 2024-01-02 NOTE — COUNSELING
[Encouraged to maintain food diary] : Encouraged to maintain food diary [Encouraged to increase physical activity] : Encouraged to increase physical activity [Encouraged to use exercise tracking device] : Encouraged to use exercise tracking device [Good understanding] : Patient has a good understanding of lifestyle changes and steps needed to achieve self management goal

## 2024-01-02 NOTE — HEALTH RISK ASSESSMENT
[Good] : ~his/her~  mood as  good [Patient reported PAP Smear was normal] : Patient reported PAP Smear was normal [None] : None [With Family] : lives with family [Employed] : employed [High School] : high school [] :  [# Of Children ___] : has [unfilled] children [Sexually Active] : sexually active [Feels Safe at Home] : Feels safe at home [Fully functional (bathing, dressing, toileting, transferring, walking, feeding)] : Fully functional (bathing, dressing, toileting, transferring, walking, feeding) [Fully functional (using the telephone, shopping, preparing meals, housekeeping, doing laundry, using] : Fully functional and needs no help or supervision to perform IADLs (using the telephone, shopping, preparing meals, housekeeping, doing laundry, using transportation, managing medications and managing finances) [Reports normal functional visual acuity (ie: able to read med bottle)] : Reports normal functional visual acuity [Smoke Detector] : smoke detector [Carbon Monoxide Detector] : carbon monoxide detector [Safety elements used in home] : safety elements used in home [Seat Belt] :  uses seat belt [Sunscreen] : uses sunscreen [Little interest or pleasure doing things] : 1) Little interest or pleasure doing things [Feeling down, depressed, or hopeless] : 2) Feeling down, depressed, or hopeless [0] : 2) Feeling down, depressed, or hopeless: Not at all (0) [PHQ-2 Negative - No further assessment needed] : PHQ-2 Negative - No further assessment needed [Change in mental status noted] : No change in mental status noted [Language] : denies difficulty with language [Behavior] : denies difficulty with behavior [Learning/Retaining New Information] : denies difficulty learning/retaining new information [Handling Complex Tasks] : denies difficulty handling complex tasks [Reasoning] : denies difficulty with reasoning [Spatial Ability and Orientation] : denies difficulty with spatial ability and orientation [High Risk Behavior] : no high risk behavior [Reports changes in hearing] : Reports no changes in hearing [Reports changes in vision] : Reports no changes in vision [Reports changes in dental health] : Reports no changes in dental health [Guns at Home] : no guns at home [Travel to Developing Areas] : does not  travel to developing areas [TB Exposure] : is not being exposed to tuberculosis [Caregiver Concerns] : does not have caregiver concerns [PapSmearDate] : 05/23 [HGP2Lntfd] : 0

## 2024-01-02 NOTE — HISTORY OF PRESENT ILLNESS
[FreeTextEntry1] : Follow up for 3 months for intiation of Busiprone and follow-up on stress management for Anxiety/Depression  [de-identified] : Patient is a 33 year old female with PMHx of cholestasis of pregnancy, borderline HTN presenting to the office for CPE, s/p 6 months ago,  birth 37 weeks, high risk pregnancy because of cholestasis of the liver. OBGYN mentioned there is kidney issues, liver issues, anemia. High stress lately, left work, anxious with taking care of kids, bills, suffered from mild anxiety in the past. constant anxiety. Feels great about kids, when she thinks about life events then she feels stressed, wants to cry.  getting vasectomy, for now no future planning for kids.  Patient endorses that she feels better, her  is getting treatment for anxiety and she is now his support system, would like some support for herself now. Patient endorses fatigue and anhedonia, but very infrequent. Patient is excited to start work soon and is happy that she gets some time off since her kids are at school.

## 2024-01-02 NOTE — ASSESSMENT
[FreeTextEntry1] : #Health Maintenance Labs reviewed  Patient advised on weight loss, healthy diet Advised to keep following with OBGYN  #Anxiety #Sleep Disturbances Mild, feels like crying sometimes, but is doing better than last time Under recent stress with work and paying bills Feels great with kids, has 3 kids, enjoys time with them PHQ-2 score 0 Continue Buspiron HCl 5mg BID; slight positive effect Patient also endorsed sleep disturbances  Start Trazadone 50 once nightly PRN; can have double effect of sleep and anti-depressant like Behavioral Therapy Request - patient will check with own insurance and select one that she can go to online Stress-reduction techniques discussed  #Eczema At fingertips, mils Triamcinolone 0.025% external cream continue  Dermatology referral given  Patient is asked to contact/return to office for any questions or concerns F/U: 3 months  Mac Jones M.D Internal Medicine, PGY-1.

## 2024-01-11 ENCOUNTER — APPOINTMENT (OUTPATIENT)
Dept: DERMATOLOGY | Facility: CLINIC | Age: 34
End: 2024-01-11
Payer: MEDICAID

## 2024-01-11 PROCEDURE — 99204 OFFICE O/P NEW MOD 45 MIN: CPT | Mod: 25

## 2024-01-11 PROCEDURE — 11900 INJECT SKIN LESIONS </W 7: CPT | Mod: 59

## 2024-01-11 PROCEDURE — 17110 DESTRUCTION B9 LES UP TO 14: CPT

## 2024-02-01 ENCOUNTER — APPOINTMENT (OUTPATIENT)
Dept: DERMATOLOGY | Facility: CLINIC | Age: 34
End: 2024-02-01
Payer: MEDICAID

## 2024-02-01 PROCEDURE — 11900 INJECT SKIN LESIONS </W 7: CPT | Mod: 59

## 2024-02-01 PROCEDURE — 17110 DESTRUCTION B9 LES UP TO 14: CPT

## 2024-02-01 PROCEDURE — 99213 OFFICE O/P EST LOW 20 MIN: CPT | Mod: 25

## 2024-02-22 ENCOUNTER — APPOINTMENT (OUTPATIENT)
Dept: DERMATOLOGY | Facility: CLINIC | Age: 34
End: 2024-02-22
Payer: MEDICAID

## 2024-02-22 PROCEDURE — 11901 INJECT SKIN LESIONS >7: CPT

## 2024-02-22 PROCEDURE — 99213 OFFICE O/P EST LOW 20 MIN: CPT | Mod: 25

## 2024-03-11 ENCOUNTER — NON-APPOINTMENT (OUTPATIENT)
Age: 34
End: 2024-03-11

## 2024-04-02 ENCOUNTER — APPOINTMENT (OUTPATIENT)
Dept: INTERNAL MEDICINE | Facility: CLINIC | Age: 34
End: 2024-04-02

## 2024-04-18 ENCOUNTER — APPOINTMENT (OUTPATIENT)
Dept: DERMATOLOGY | Facility: CLINIC | Age: 34
End: 2024-04-18

## 2024-07-29 ENCOUNTER — APPOINTMENT (OUTPATIENT)
Dept: INTERNAL MEDICINE | Facility: CLINIC | Age: 34
End: 2024-07-29
Payer: COMMERCIAL

## 2024-07-29 VITALS
DIASTOLIC BLOOD PRESSURE: 86 MMHG | RESPIRATION RATE: 15 BRPM | TEMPERATURE: 98 F | HEIGHT: 60 IN | SYSTOLIC BLOOD PRESSURE: 138 MMHG | OXYGEN SATURATION: 98 % | WEIGHT: 200 LBS | BODY MASS INDEX: 39.27 KG/M2 | HEART RATE: 70 BPM

## 2024-07-29 DIAGNOSIS — E66.9 OBESITY, UNSPECIFIED: ICD-10-CM

## 2024-07-29 DIAGNOSIS — F41.9 ANXIETY DISORDER, UNSPECIFIED: ICD-10-CM

## 2024-07-29 DIAGNOSIS — L29.0 PRURITUS ANI: ICD-10-CM

## 2024-07-29 PROCEDURE — 99214 OFFICE O/P EST MOD 30 MIN: CPT

## 2024-07-29 PROCEDURE — 36415 COLL VENOUS BLD VENIPUNCTURE: CPT

## 2024-07-29 PROCEDURE — G2211 COMPLEX E/M VISIT ADD ON: CPT | Mod: NC

## 2024-07-29 RX ORDER — CLOTRIMAZOLE AND BETAMETHASONE DIPROPIONATE 10; .5 MG/G; MG/G
1-0.05 CREAM TOPICAL TWICE DAILY
Qty: 1 | Refills: 1 | Status: ACTIVE | COMMUNITY
Start: 2024-07-29 | End: 1900-01-01

## 2024-07-29 NOTE — HEALTH RISK ASSESSMENT
[Good] : ~his/her~  mood as  good [Patient reported PAP Smear was normal] : Patient reported PAP Smear was normal [None] : None [With Family] : lives with family [Employed] : employed [High School] : high school [] :  [# Of Children ___] : has [unfilled] children [Sexually Active] : sexually active [Feels Safe at Home] : Feels safe at home [Fully functional (bathing, dressing, toileting, transferring, walking, feeding)] : Fully functional (bathing, dressing, toileting, transferring, walking, feeding) [Fully functional (using the telephone, shopping, preparing meals, housekeeping, doing laundry, using] : Fully functional and needs no help or supervision to perform IADLs (using the telephone, shopping, preparing meals, housekeeping, doing laundry, using transportation, managing medications and managing finances) [Reports normal functional visual acuity (ie: able to read med bottle)] : Reports normal functional visual acuity [Smoke Detector] : smoke detector [Carbon Monoxide Detector] : carbon monoxide detector [Safety elements used in home] : safety elements used in home [Seat Belt] :  uses seat belt [Sunscreen] : uses sunscreen [Little interest or pleasure doing things] : 1) Little interest or pleasure doing things [Feeling down, depressed, or hopeless] : 2) Feeling down, depressed, or hopeless [0] : 2) Feeling down, depressed, or hopeless: Not at all (0) [PHQ-2 Negative - No further assessment needed] : PHQ-2 Negative - No further assessment needed [Change in mental status noted] : No change in mental status noted [Language] : denies difficulty with language [Behavior] : denies difficulty with behavior [Learning/Retaining New Information] : denies difficulty learning/retaining new information [Handling Complex Tasks] : denies difficulty handling complex tasks [Reasoning] : denies difficulty with reasoning [Spatial Ability and Orientation] : denies difficulty with spatial ability and orientation [High Risk Behavior] : no high risk behavior [Reports changes in hearing] : Reports no changes in hearing [Reports changes in vision] : Reports no changes in vision [Reports changes in dental health] : Reports no changes in dental health [Guns at Home] : no guns at home [Travel to Developing Areas] : does not  travel to developing areas [TB Exposure] : is not being exposed to tuberculosis [Caregiver Concerns] : does not have caregiver concerns [PapSmearDate] : 05/23 [[id number]] : 0

## 2024-07-29 NOTE — ASSESSMENT
[FreeTextEntry1] : #Weight Gain, Obesity Class II Advised on exercise Advised on Diet, states that she already has a good diet Thyroid US to r/o metabolic issues TFTs ordered Lipid Panel Ordered - Can f/u next time if everything is clear with weight loss medications like Wegovy  #Anxiety #Sleep Disturbances Switched to Lexapro 10mg Can be contributing to weight gain Pt feels good on it, continue Stress-reduction techniques discussed  #Anal Itching Likely contact pruritis in summer months Prescribe Clotrimazole-Betamethasone cream Monitor relief, if not, then advised to see OBGYN  Patient is asked to contact/return to office for any questions or concerns F/U: 1 month for weight loss discussion  Mac Jones M.D Internal Medicine, PGY-2

## 2024-07-29 NOTE — HISTORY OF PRESENT ILLNESS
[FreeTextEntry1] : Depression/Anxiety, Weight Gain, Anal Itching [de-identified] : Patient is a 33 year old female with PMHx of cholestasis of pregnancy, borderline HTN , s/p 6 months ago,  birth 37 weeks, high risk pregnancy because of cholestasis of the liver.  Patient endorses that she feels better, was recently switched to Lexapro. Patient states that she has increased weight gain over the past 6 months and this is worsening her anxiety. She also complains of anal itching with no excoriations or marking that has been going on for the past 2-3 months, worse at nighttime, but constant throughout the day.

## 2024-07-30 LAB
ALBUMIN SERPL ELPH-MCNC: 4.4 G/DL
ALP BLD-CCNC: 95 U/L
ALT SERPL-CCNC: 14 U/L
ANION GAP SERPL CALC-SCNC: 12 MMOL/L
AST SERPL-CCNC: 10 U/L
BILIRUB SERPL-MCNC: 0.2 MG/DL
BUN SERPL-MCNC: 11 MG/DL
CALCIUM SERPL-MCNC: 9.4 MG/DL
CHLORIDE SERPL-SCNC: 103 MMOL/L
CHOLEST SERPL-MCNC: 190 MG/DL
CO2 SERPL-SCNC: 25 MMOL/L
CREAT SERPL-MCNC: 0.59 MG/DL
EGFR: 123 ML/MIN/1.73M2
GLUCOSE SERPL-MCNC: 94 MG/DL
HDLC SERPL-MCNC: 35 MG/DL
LDLC SERPL CALC-MCNC: 138 MG/DL
NONHDLC SERPL-MCNC: 155 MG/DL
POTASSIUM SERPL-SCNC: 4.6 MMOL/L
PROT SERPL-MCNC: 7.4 G/DL
SODIUM SERPL-SCNC: 140 MMOL/L
T3FREE SERPL-MCNC: 3.2 PG/ML
T4 FREE SERPL-MCNC: 1.1 NG/DL
TRIGL SERPL-MCNC: 92 MG/DL
TSH SERPL-ACNC: 1.12 UIU/ML

## 2024-07-31 ENCOUNTER — APPOINTMENT (OUTPATIENT)
Dept: ULTRASOUND IMAGING | Facility: CLINIC | Age: 34
End: 2024-07-31
Payer: COMMERCIAL

## 2024-07-31 ENCOUNTER — OUTPATIENT (OUTPATIENT)
Dept: OUTPATIENT SERVICES | Facility: HOSPITAL | Age: 34
LOS: 1 days | End: 2024-07-31

## 2024-07-31 DIAGNOSIS — E66.9 OBESITY, UNSPECIFIED: ICD-10-CM

## 2024-07-31 DIAGNOSIS — Z98.89 OTHER SPECIFIED POSTPROCEDURAL STATES: Chronic | ICD-10-CM

## 2024-07-31 PROCEDURE — 76536 US EXAM OF HEAD AND NECK: CPT | Mod: 26

## 2024-09-04 ENCOUNTER — APPOINTMENT (OUTPATIENT)
Dept: INTERNAL MEDICINE | Facility: CLINIC | Age: 34
End: 2024-09-04
Payer: COMMERCIAL

## 2024-09-04 VITALS
OXYGEN SATURATION: 98 % | DIASTOLIC BLOOD PRESSURE: 90 MMHG | WEIGHT: 199 LBS | SYSTOLIC BLOOD PRESSURE: 140 MMHG | BODY MASS INDEX: 39.07 KG/M2 | HEART RATE: 75 BPM | HEIGHT: 60 IN | RESPIRATION RATE: 12 BRPM

## 2024-09-04 DIAGNOSIS — E66.9 OBESITY, UNSPECIFIED: ICD-10-CM

## 2024-09-04 DIAGNOSIS — N39.0 URINARY TRACT INFECTION, SITE NOT SPECIFIED: ICD-10-CM

## 2024-09-04 DIAGNOSIS — F32.A DEPRESSION, UNSPECIFIED: ICD-10-CM

## 2024-09-04 PROCEDURE — G2211 COMPLEX E/M VISIT ADD ON: CPT | Mod: NC

## 2024-09-04 PROCEDURE — 99214 OFFICE O/P EST MOD 30 MIN: CPT

## 2024-09-04 RX ORDER — SEMAGLUTIDE 0.25 MG/.5ML
0.25 INJECTION, SOLUTION SUBCUTANEOUS WEEKLY
Qty: 1 | Refills: 0 | Status: ACTIVE | COMMUNITY
Start: 2024-09-04 | End: 1900-01-01

## 2024-09-04 RX ORDER — ESCITALOPRAM OXALATE 10 MG/1
10 TABLET ORAL DAILY
Qty: 30 | Refills: 2 | Status: ACTIVE | COMMUNITY
Start: 2024-09-04 | End: 1900-01-01

## 2024-09-04 NOTE — HISTORY OF PRESENT ILLNESS
[FreeTextEntry1] : Follow-up visit on weight loss discussion [de-identified] : 33 year old female with PMHx of cholestasis of pregnancy, borderline HTN , s/p 6 months ago,  birth 37 weeks, high risk pregnancy because of cholestasis of the liver.  Patient endorses that she feels better, was recently switched to Lexapro. Patient states that she has increased weight gain over the past 6 months and this is worsening her anxiety. She states that she also has a UTI and is taking Nitrofurantoin on day 3 currently  Endorses as above Denies chest pain, shortness of breath, nausea, vomiting, diarrhea, fevers, chills

## 2024-09-04 NOTE — HEALTH RISK ASSESSMENT
[Good] : ~his/her~  mood as  good [Patient reported PAP Smear was normal] : Patient reported PAP Smear was normal [None] : None [With Family] : lives with family [Employed] : employed [High School] : high school [] :  [# Of Children ___] : has [unfilled] children [Sexually Active] : sexually active [Feels Safe at Home] : Feels safe at home [Fully functional (bathing, dressing, toileting, transferring, walking, feeding)] : Fully functional (bathing, dressing, toileting, transferring, walking, feeding) [Fully functional (using the telephone, shopping, preparing meals, housekeeping, doing laundry, using] : Fully functional and needs no help or supervision to perform IADLs (using the telephone, shopping, preparing meals, housekeeping, doing laundry, using transportation, managing medications and managing finances) [Reports normal functional visual acuity (ie: able to read med bottle)] : Reports normal functional visual acuity [Smoke Detector] : smoke detector [Carbon Monoxide Detector] : carbon monoxide detector [Safety elements used in home] : safety elements used in home [Seat Belt] :  uses seat belt [Sunscreen] : uses sunscreen [Little interest or pleasure doing things] : 1) Little interest or pleasure doing things [Feeling down, depressed, or hopeless] : 2) Feeling down, depressed, or hopeless [0] : 2) Feeling down, depressed, or hopeless: Not at all (0) [PHQ-2 Negative - No further assessment needed] : PHQ-2 Negative - No further assessment needed [Change in mental status noted] : No change in mental status noted [Language] : denies difficulty with language [Behavior] : denies difficulty with behavior [Learning/Retaining New Information] : denies difficulty learning/retaining new information [Handling Complex Tasks] : denies difficulty handling complex tasks [Reasoning] : denies difficulty with reasoning [Spatial Ability and Orientation] : denies difficulty with spatial ability and orientation [High Risk Behavior] : no high risk behavior [Reports changes in hearing] : Reports no changes in hearing [Reports changes in vision] : Reports no changes in vision [Reports changes in dental health] : Reports no changes in dental health [Guns at Home] : no guns at home [Travel to Developing Areas] : does not  travel to developing areas [TB Exposure] : is not being exposed to tuberculosis [Caregiver Concerns] : does not have caregiver concerns [PapSmearDate] : 05/23 [[id number]] : 0

## 2024-09-04 NOTE — PHYSICAL EXAM
[No Acute Distress] : no acute distress [Well Nourished] : well nourished [Well Developed] : well developed [Well-Appearing] : well-appearing [Normal Sclera/Conjunctiva] : normal sclera/conjunctiva [PERRL] : pupils equal round and reactive to light [EOMI] : extraocular movements intact [Normal Outer Ear/Nose] : the outer ears and nose were normal in appearance [Normal Oropharynx] : the oropharynx was normal [No JVD] : no jugular venous distention [No Lymphadenopathy] : no lymphadenopathy [Supple] : supple [Thyroid Normal, No Nodules] : the thyroid was normal and there were no nodules present [No Respiratory Distress] : no respiratory distress  [No Accessory Muscle Use] : no accessory muscle use [Clear to Auscultation] : lungs were clear to auscultation bilaterally [Normal Rate] : normal rate  [Regular Rhythm] : with a regular rhythm [Normal S1, S2] : normal S1 and S2 [No Murmur] : no murmur heard [No Carotid Bruits] : no carotid bruits [No Abdominal Bruit] : a ~M bruit was not heard ~T in the abdomen [No Varicosities] : no varicosities [Pedal Pulses Present] : the pedal pulses are present [No Edema] : there was no peripheral edema [No Palpable Aorta] : no palpable aorta [No Extremity Clubbing/Cyanosis] : no extremity clubbing/cyanosis [Soft] : abdomen soft [Non Tender] : non-tender [Non-distended] : non-distended [No Masses] : no abdominal mass palpated [No HSM] : no HSM [Normal Bowel Sounds] : normal bowel sounds [No CVA Tenderness] : no CVA  tenderness [No Spinal Tenderness] : no spinal tenderness [No Joint Swelling] : no joint swelling [Grossly Normal Strength/Tone] : grossly normal strength/tone [No Rash] : no rash [No Focal Deficits] : no focal deficits [Coordination Grossly Intact] : coordination grossly intact [Normal Gait] : normal gait [Deep Tendon Reflexes (DTR)] : deep tendon reflexes were 2+ and symmetric [Normal Affect] : the affect was normal [Normal Insight/Judgement] : insight and judgment were intact

## 2024-09-04 NOTE — ASSESSMENT
[FreeTextEntry1] : #Weight Gain, Obesity Class II Advised on exercise Advised on Diet, states that she already has a good diet Thyroid US normal TFTs normal Lipid Panel Slightly elevated - Will start Wegovy 0.25mg once weekly, will uptitrate medication based off side effects - Pt will f/u in 1 month  #Anxiety #Sleep Disturbances Prescribed Lexapro 10mg Can be contributing to weight gain Pt feels good on it, continue Stress-reduction techniques discussed  #UTI - Telehealth visit this Monday - On Day 3 of Nitrofurantoin - Still with slight dysuria and frequency - Advised to continue and if worsening or symptoms to not resolve, return to office  Patient is asked to contact/return to office for any questions or concerns F/U: 1 month for weight loss medication side effects  Mac Jones M.D Internal Medicine, PGY-2

## 2025-02-26 ENCOUNTER — APPOINTMENT (OUTPATIENT)
Dept: INTERNAL MEDICINE | Facility: CLINIC | Age: 35
End: 2025-02-26
Payer: COMMERCIAL

## 2025-02-26 ENCOUNTER — NON-APPOINTMENT (OUTPATIENT)
Age: 35
End: 2025-02-26

## 2025-02-26 VITALS
SYSTOLIC BLOOD PRESSURE: 127 MMHG | DIASTOLIC BLOOD PRESSURE: 85 MMHG | BODY MASS INDEX: 33.97 KG/M2 | RESPIRATION RATE: 12 BRPM | HEIGHT: 64 IN | HEART RATE: 81 BPM | OXYGEN SATURATION: 96 % | WEIGHT: 199 LBS

## 2025-02-26 DIAGNOSIS — F41.9 ANXIETY DISORDER, UNSPECIFIED: ICD-10-CM

## 2025-02-26 DIAGNOSIS — Z00.00 ENCOUNTER FOR GENERAL ADULT MEDICAL EXAMINATION W/OUT ABNORMAL FINDINGS: ICD-10-CM

## 2025-02-26 DIAGNOSIS — E66.812 OBESITY, CLASS 2: ICD-10-CM

## 2025-02-26 PROCEDURE — 99395 PREV VISIT EST AGE 18-39: CPT

## 2025-02-26 PROCEDURE — 36415 COLL VENOUS BLD VENIPUNCTURE: CPT

## 2025-02-26 PROCEDURE — 93000 ELECTROCARDIOGRAM COMPLETE: CPT

## 2025-02-27 LAB
BASOPHILS # BLD AUTO: 0.03 K/UL
BASOPHILS NFR BLD AUTO: 0.3 %
EOSINOPHIL # BLD AUTO: 0.06 K/UL
EOSINOPHIL NFR BLD AUTO: 0.5 %
ESTIMATED AVERAGE GLUCOSE: 111 MG/DL
HBA1C MFR BLD HPLC: 5.5 %
HCT VFR BLD CALC: 40.4 %
HGB BLD-MCNC: 12.2 G/DL
IMM GRANULOCYTES NFR BLD AUTO: 0.3 %
LYMPHOCYTES # BLD AUTO: 3.12 K/UL
LYMPHOCYTES NFR BLD AUTO: 26.8 %
MAN DIFF?: NORMAL
MCHC RBC-ENTMCNC: 26.4 PG
MCHC RBC-ENTMCNC: 30.2 G/DL
MCV RBC AUTO: 87.4 FL
MONOCYTES # BLD AUTO: 0.81 K/UL
MONOCYTES NFR BLD AUTO: 7 %
NEUTROPHILS # BLD AUTO: 7.6 K/UL
NEUTROPHILS NFR BLD AUTO: 65.1 %
PLATELET # BLD AUTO: 340 K/UL
RBC # BLD: 4.62 M/UL
RBC # FLD: 16.5 %
WBC # FLD AUTO: 11.65 K/UL

## 2025-02-28 LAB
APPEARANCE: CLEAR
BACTERIA: ABNORMAL /HPF
BILIRUBIN URINE: NEGATIVE
BLOOD URINE: NEGATIVE
CAST: 0 /LPF
COLOR: YELLOW
EPITHELIAL CELLS: 13 /HPF
GLUCOSE QUALITATIVE U: NEGATIVE MG/DL
HIV1+2 AB SPEC QL IA.RAPID: NONREACTIVE
KETONES URINE: NEGATIVE MG/DL
LEUKOCYTE ESTERASE URINE: NEGATIVE
MICROSCOPIC-UA: NORMAL
NITRITE URINE: NEGATIVE
PH URINE: 7
PROTEIN URINE: NEGATIVE MG/DL
RED BLOOD CELLS URINE: 2 /HPF
SPECIFIC GRAVITY URINE: 1.02
UROBILINOGEN URINE: 0.2 MG/DL
WHITE BLOOD CELLS URINE: 1 /HPF

## 2025-03-04 RX ORDER — TIRZEPATIDE 2.5 MG/.5ML
2.5 INJECTION, SOLUTION SUBCUTANEOUS
Qty: 1 | Refills: 0 | Status: ACTIVE | COMMUNITY
Start: 2025-02-26

## 2025-03-07 DIAGNOSIS — G43.909 MIGRAINE, UNSPECIFIED, NOT INTRACTABLE, W/OUT STATUS MIGRAINOSUS: ICD-10-CM

## 2025-03-07 RX ORDER — ACETAMINOPHEN, ASPIRIN, AND CAFFEINE 250; 250; 65 MG/1; MG/1; MG/1
250-250-65 TABLET, FILM COATED ORAL 3 TIMES DAILY
Qty: 90 | Refills: 0 | Status: ACTIVE | COMMUNITY
Start: 2025-03-07 | End: 1900-01-01

## 2025-06-03 ENCOUNTER — APPOINTMENT (OUTPATIENT)
Dept: DERMATOLOGY | Facility: CLINIC | Age: 35
End: 2025-06-03
Payer: COMMERCIAL

## 2025-06-03 PROCEDURE — 99213 OFFICE O/P EST LOW 20 MIN: CPT

## 2025-06-05 ENCOUNTER — NON-APPOINTMENT (OUTPATIENT)
Age: 35
End: 2025-06-05

## 2025-06-26 ENCOUNTER — APPOINTMENT (OUTPATIENT)
Dept: INTERNAL MEDICINE | Facility: CLINIC | Age: 35
End: 2025-06-26
Payer: COMMERCIAL

## 2025-06-26 VITALS
DIASTOLIC BLOOD PRESSURE: 88 MMHG | BODY MASS INDEX: 39.07 KG/M2 | SYSTOLIC BLOOD PRESSURE: 137 MMHG | TEMPERATURE: 97 F | RESPIRATION RATE: 12 BRPM | HEART RATE: 95 BPM | WEIGHT: 199 LBS | OXYGEN SATURATION: 97 % | HEIGHT: 60 IN

## 2025-06-26 PROBLEM — L70.9 ACNE: Status: ACTIVE | Noted: 2025-06-26

## 2025-06-26 PROBLEM — L68.0 HIRSUTISM: Status: ACTIVE | Noted: 2025-06-26

## 2025-06-26 PROBLEM — Z91.89 AT RISK FOR SLEEP APNEA: Status: ACTIVE | Noted: 2025-06-26

## 2025-06-26 PROCEDURE — G2211 COMPLEX E/M VISIT ADD ON: CPT

## 2025-06-26 PROCEDURE — 99214 OFFICE O/P EST MOD 30 MIN: CPT | Mod: GC

## 2025-06-27 LAB
ANTI-MUELLERIAN HORMONE: 1.22 NG/ML
PROLACTIN SERPL-MCNC: 8.5 NG/ML

## 2025-07-01 LAB — 17OHP SERPL-MCNC: 22 NG/DL

## 2025-07-08 ENCOUNTER — OUTPATIENT (OUTPATIENT)
Dept: OUTPATIENT SERVICES | Facility: HOSPITAL | Age: 35
LOS: 1 days | End: 2025-07-08
Payer: COMMERCIAL

## 2025-07-08 DIAGNOSIS — Z98.89 OTHER SPECIFIED POSTPROCEDURAL STATES: Chronic | ICD-10-CM

## 2025-07-08 DIAGNOSIS — G47.33 OBSTRUCTIVE SLEEP APNEA (ADULT) (PEDIATRIC): ICD-10-CM

## 2025-07-08 PROCEDURE — 95800 SLP STDY UNATTENDED: CPT | Mod: 26

## 2025-07-28 ENCOUNTER — OUTPATIENT (OUTPATIENT)
Dept: OUTPATIENT SERVICES | Facility: HOSPITAL | Age: 35
LOS: 1 days | End: 2025-07-28

## 2025-07-28 ENCOUNTER — APPOINTMENT (OUTPATIENT)
Dept: ULTRASOUND IMAGING | Facility: CLINIC | Age: 35
End: 2025-07-28
Payer: COMMERCIAL

## 2025-07-28 DIAGNOSIS — Z98.89 OTHER SPECIFIED POSTPROCEDURAL STATES: Chronic | ICD-10-CM

## 2025-07-28 DIAGNOSIS — Z00.8 ENCOUNTER FOR OTHER GENERAL EXAMINATION: ICD-10-CM

## 2025-07-28 PROCEDURE — 76536 US EXAM OF HEAD AND NECK: CPT | Mod: 26

## 2025-08-22 ENCOUNTER — APPOINTMENT (OUTPATIENT)
Dept: INTERNAL MEDICINE | Facility: CLINIC | Age: 35
End: 2025-08-22
Payer: COMMERCIAL

## 2025-08-22 VITALS
DIASTOLIC BLOOD PRESSURE: 82 MMHG | WEIGHT: 199 LBS | RESPIRATION RATE: 12 BRPM | SYSTOLIC BLOOD PRESSURE: 121 MMHG | HEIGHT: 60 IN | BODY MASS INDEX: 39.07 KG/M2 | HEART RATE: 99 BPM | TEMPERATURE: 96.4 F | OXYGEN SATURATION: 100 %

## 2025-08-22 DIAGNOSIS — Z00.00 ENCOUNTER FOR GENERAL ADULT MEDICAL EXAMINATION W/OUT ABNORMAL FINDINGS: ICD-10-CM

## 2025-08-22 DIAGNOSIS — E66.812 OBESITY, CLASS 2: ICD-10-CM

## 2025-08-22 DIAGNOSIS — L29.0 PRURITUS ANI: ICD-10-CM

## 2025-08-22 DIAGNOSIS — E28.2 POLYCYSTIC OVARIAN SYNDROME: ICD-10-CM

## 2025-08-22 DIAGNOSIS — L68.0 HIRSUTISM: ICD-10-CM

## 2025-08-22 DIAGNOSIS — L70.9 ACNE, UNSPECIFIED: ICD-10-CM

## 2025-08-22 DIAGNOSIS — F41.9 ANXIETY DISORDER, UNSPECIFIED: ICD-10-CM

## 2025-08-22 PROCEDURE — G2211 COMPLEX E/M VISIT ADD ON: CPT

## 2025-08-22 PROCEDURE — 99214 OFFICE O/P EST MOD 30 MIN: CPT | Mod: GC

## 2025-08-22 RX ORDER — PROPRANOLOL HYDROCHLORIDE 10 MG/1
10 TABLET ORAL 3 TIMES DAILY
Qty: 90 | Refills: 1 | Status: ACTIVE | COMMUNITY
Start: 2025-08-22 | End: 1900-01-01

## 2025-08-22 RX ORDER — SPIRONOLACTONE 25 MG/1
25 TABLET ORAL
Qty: 30 | Refills: 1 | Status: ACTIVE | COMMUNITY
Start: 2025-08-22 | End: 1900-01-01

## 2025-08-25 ENCOUNTER — OUTPATIENT (OUTPATIENT)
Dept: OUTPATIENT SERVICES | Facility: HOSPITAL | Age: 35
LOS: 1 days | End: 2025-08-25

## 2025-08-25 ENCOUNTER — NON-APPOINTMENT (OUTPATIENT)
Age: 35
End: 2025-08-25

## 2025-08-25 ENCOUNTER — APPOINTMENT (OUTPATIENT)
Dept: INTERNAL MEDICINE | Facility: CLINIC | Age: 35
End: 2025-08-25

## 2025-08-26 ENCOUNTER — TRANSCRIPTION ENCOUNTER (OUTPATIENT)
Age: 35
End: 2025-08-26

## 2025-08-27 RX ORDER — TIRZEPATIDE 2.5 MG/.5ML
2.5 INJECTION, SOLUTION SUBCUTANEOUS
Qty: 1 | Refills: 1 | Status: ACTIVE | COMMUNITY
Start: 2025-08-22